# Patient Record
Sex: MALE | Race: WHITE | NOT HISPANIC OR LATINO
[De-identification: names, ages, dates, MRNs, and addresses within clinical notes are randomized per-mention and may not be internally consistent; named-entity substitution may affect disease eponyms.]

---
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
## 2021-06-21 NOTE — ASU DISCHARGE PLAN (ADULT/PEDIATRIC). - YOU WERE IN THE HOSPITAL FOR:
Patient Transferred to PACU  Handoff Report to be given at bedside Minimal excision of Pilonidal cyst

## 2022-05-26 NOTE — ASU PATIENT PROFILE, PEDIATRIC - TEACHING/LEARNING RELIGIOUS CONSIDERATIONS PEDS
Note Text (......Xxx Chief Complaint.): This diagnosis correlates with the Render Risk Assessment In Note?: yes Detail Level: Zone Other (Free Text): patient is doing okay with minimal to no side effects.  Medication appears effective at this time.  Will continue MTX and check labs and recheck skin in 3 months Patient Management Risk Assessment: Low none